# Patient Record
Sex: FEMALE | Race: WHITE
[De-identification: names, ages, dates, MRNs, and addresses within clinical notes are randomized per-mention and may not be internally consistent; named-entity substitution may affect disease eponyms.]

---

## 2021-06-13 ENCOUNTER — HOSPITAL ENCOUNTER (EMERGENCY)
Dept: HOSPITAL 97 - ER | Age: 66
Discharge: HOME | End: 2021-06-13
Payer: COMMERCIAL

## 2021-06-13 VITALS — DIASTOLIC BLOOD PRESSURE: 80 MMHG | SYSTOLIC BLOOD PRESSURE: 122 MMHG

## 2021-06-13 VITALS — TEMPERATURE: 98.3 F | OXYGEN SATURATION: 100 %

## 2021-06-13 DIAGNOSIS — S61.313A: Primary | ICD-10-CM

## 2021-06-13 DIAGNOSIS — S62.633A: ICD-10-CM

## 2021-06-13 DIAGNOSIS — Z23: ICD-10-CM
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
## 2021-06-13 PROCEDURE — 96372 THER/PROPH/DIAG INJ SC/IM: CPT

## 2021-06-13 PROCEDURE — 0JQK0ZZ REPAIR LEFT HAND SUBCUTANEOUS TISSUE AND FASCIA, OPEN APPROACH: ICD-10-PCS

## 2021-06-13 PROCEDURE — 90471 IMMUNIZATION ADMIN: CPT

## 2021-06-13 PROCEDURE — 90714 TD VACC NO PRESV 7 YRS+ IM: CPT

## 2021-06-13 PROCEDURE — 73130 X-RAY EXAM OF HAND: CPT

## 2021-06-13 PROCEDURE — 12001 RPR S/N/AX/GEN/TRNK 2.5CM/<: CPT

## 2021-06-13 PROCEDURE — 29130 APPL FINGER SPLINT STATIC: CPT

## 2021-06-13 PROCEDURE — 99284 EMERGENCY DEPT VISIT MOD MDM: CPT

## 2021-06-13 PROCEDURE — 2W3KX1Z IMMOBILIZATION OF LEFT FINGER USING SPLINT: ICD-10-PCS

## 2021-06-13 NOTE — RAD REPORT
EXAM DESCRIPTION:  RAD - Hand Left 3 View - 6/13/2021 4:20 pm

 

CLINICAL HISTORY:  laceration

Pain and swelling

 

COMPARISON:  <Comparisons>

 

FINDINGS:  Soft tissue laceration is seen involving the distal third finger with underlying tuft frac
ture.

## 2021-06-13 NOTE — EDPHYS
Physician Documentation                                                                           

 [facility]                                                                 

Name: Louise Pérez                                                                              

Age: 65 yrs                                                                                       

Sex: Female                                                                                       

: 1955                                                                                   

MRN: [id number]                                                                                   

Arrival Date: 2021                                                                          

Time: 15:37                                                                                       

Account#: [id number]                                                                            

Bed 23                                                                                            

Private MD: Ulisses Zuñiga V                                                                      

ED Physician Boris Milton                                                                         

HPI:                                                                                              

                                                                                             

16:10 This 65 yrs old  Female presents to ER via Ambulatory with complaints of       pm1 

      Finger Injury.                                                                              

16:10 The patient or guardian reports a laceration. The complaints affect the left middle     pm1 

      finger. Context: The problem was sustained outdoors, resulted from laceration from her      

      hedge cutter. Onset: The symptoms/episode began/occurred just prior to arrival.             

      Modifying factors: The symptoms are alleviated by pressure to area, the symptoms are        

      aggravated by nothing. Associated signs and symptoms: Pertinent negatives: cyanosis         

      distally, decreased sensation distally, numbness distally, tingling distally. Severity      

      of symptoms: in the emergency department the symptoms have improved, bleeding resolved.     

      The patient has not experienced similar symptoms in the past. Patient was cutting the       

      hedges with one hand and somehow the edger ended up cutting her left middle finger as       

      she was clearing out leaves.                                                                

                                                                                                  

Historical:                                                                                       

- Allergies:                                                                                      

15:47 No Known Allergies;                                                                     ll1 

- PMHx:                                                                                           

15:47 None;                                                                                   ll1 

- PSHx:                                                                                           

15:47 RIB FRACTURE - 1 WEEK AGO; knee SX;                                                     ll1 

                                                                                                  

- Immunization history:: Client reports having NOT received the Covid vaccine. Last               

  tetanus immunization: unknown, Flu vaccine is not up to date.                                   

- Social history:: Smoking status: Patient denies any tobacco usage or history of.                

                                                                                                  

                                                                                                  

ROS:                                                                                              

16:10 Constitutional: Negative for fever, chills, and weight loss, Cardiovascular: Negative   pm1 

      for chest pain, palpitations, and edema, Respiratory: Negative for shortness of breath,     

      cough, wheezing, and pleuritic chest pain, Abdomen/GI: Negative for abdominal pain,         

      nausea, vomiting, diarrhea, and constipation, Neuro: Negative for headache, weakness,       

      numbness, tingling, and seizure.                                                            

16:10 MS/extremity: Positive for laceration, of the left middle finger, Negative for              

      decreased range of motion.                                                                  

16:10 Skin: Positive for laceration(s), of the left middle finger.                                

16:10 All other systems are negative.                                                             

                                                                                                  

Exam:                                                                                             

16:10 Constitutional:  This is a well developed, well nourished patient who is awake, alert,  pm1 

      and in no acute distress. Head/Face:  Normocephalic, atraumatic.                            

16:10 ENT: Mouth: is normal, Lips: normal, moist, Oral mucosa: normal, pink and intact, moist.    

16:10 Cardiovascular: Rate: normal, Rhythm: regular, Pulses: no pulse deficits are                

      appreciated.                                                                                

16:10 Respiratory: Exam negative for  acute changes, respiratory distress, shortness of           

      breath.                                                                                     

16:10 Musculoskeletal/extremity: Extremities: grossly normal except: noted in the palmar          

      aspect of distal phalanx of left middle finger and left middle fingernail: laceration,      

      There is no evidence of  decreased ROM.                                                     

16:10 Skin: Appearance: normal except for affected area, injury, laceration(s), the wound is      

      approximately  2.5 cm(s), with a depth of  0.3 cm(s), of the palmar aspect of distal        

      phalanx of left middle finger and left middle fingernail, that can be described as          

      clean, no foreign body, irregular, without bleeding.                                        

16:10 Neuro: Exam negative for acute changes, Orientation: is normal, Mentation: is normal,       

      Motor: is normal, moves all fours, Sensation: is normal, no obvious gross deficits.         

                                                                                                  

Vital Signs:                                                                                      

15:44  / 105; Pulse 91; Resp 17; Temp 98.3; Pulse Ox 100% ; Weight 79.38 kg; Height 5   ll1 

      ft. 10 in. (177.80 cm); Pain 7/10;                                                          

18:47  / 80; Pulse 83; Resp 18; Pulse Ox 100% on R/A;                                   ae4 

15:44 Body Mass Index 25.11 (79.38 kg, 177.80 cm)                                             ll1 

                                                                                                  

Laceration:                                                                                       

21:17 Wound Repair of 2.5cm ( 1.0in ) subcutaneous laceration to palmar aspect of distal      pm1 

      phalanx of left middle finger and left middle fingernail. Irregularly shaped.. Distal       

      neuro/vascular/tendon intact. Anesthesia: Digital block administered with 2 mls of          

      Lido/Marcaine. Wound prep: Extensive cleansing with betadine with hibiclenz by me,          

      Wound irrigation with saline by me, Wound explored extensively, Copious irrigation.         

      Skin closed with 8 4-0 Prolene using simple sutures and sterile technique. nail closed      

      with thin layer Adhesive skin closure using Dermabond. Dressed with 4x4's, finger           

      splint. Patient tolerated well.                                                             

                                                                                                  

MDM:                                                                                              

15:52 Patient medically screened.                                                             pm1 

17:48 Data reviewed: vital signs. Data interpreted: Pulse oximetry: on room air is 100 %.     pm1 

      Interpretation: normal. Counseling: I had a detailed discussion with the patient and/or     

      guardian regarding: the historical points, exam findings, and any diagnostic results        

      supporting the discharge/admit diagnosis, radiology results, the need for outpatient        

      follow up, a hand specialist, to return to the emergency department if symptoms worsen      

      or persist or if there are any questions or concerns that arise at home.                    

                                                                                                  

                                                                                             

15:54 Order name: Hand Left 3 View XRAY; Complete Time: 16:35                                 pm1 

                                                                                             

15:54 Order name: Prolene, Sutures; Complete Time: 17:09                                      pm1 

                                                                                             

15:54 Order name: Dressing - Wound; Complete Time: 16:21                                      pm1 

                                                                                             

15:54 Order name: Gloves, Sterile; Complete Time: 16:05                                       pm1 

                                                                                             

15:54 Order name: Setup Suture Tray; Complete Time: 16:06                                     pm1 

                                                                                             

17:50 Order name: Splint - Finger; Complete Time: 18:46                                       pm1 

                                                                                                  

Administered Medications:                                                                         

16:10 Drug: Norco (HYDROcodone-acetaminophen) 5 mg-325 mg 1 tabs Route: PO;                   ae4 

18:46 Follow up: Response: No adverse reaction; Pain is decreased; RASS: Alert and Calm (0)   ae4 

16:29 Drug: Tetanus-Diphtheria Toxoid Adult 0.5 ml {: Buyapowa. Exp:         ae4 

      2023. Lot #: [id number]. } Route: IM; Site: right deltoid;                                 

17:06 Follow up: Response: No adverse reaction                                                ae4 

16:45 Drug: Lidocaine (1 %) 5 ml {Note: Administered by Hansel Ware NP to affected       ae4 

      area.} Volume: 5 ml; Route: Infiltration;                                                   

16:45 Drug: Marcaine (bupivacaine) (0.5 %) 10 ml {Note: ADminnistered by Hansel Ware NP  ae4 

      to affected area.} Volume: 10 ml; Route: Infiltration;                                      

18:16 Drug: Ancef (cefazolin) 1 grams Route: IM; Site: right gluteus;                         ae4 

18:46 Follow up: Response: No adverse reaction                                                ae4 

                                                                                                  

                                                                                                  

Disposition:                                                                                      

21 17:50 Discharged to Home. Impression: Laceration without foreign body of left middle     

  finger with damage to nail, Left middle finger distal tuft fracture.                            

- Condition is Stable.                                                                            

- Discharge Instructions: Cast or Splint Care, Adult, Tissue Adhesive Wound Care,                 

  Laceration Care, Adult.                                                                         

- Prescriptions for Keflex 500 mg Oral Capsule - take 1 capsule by ORAL route every 6             

  hours for 10 days; 40 capsule.                                                                  

- Medication Reconciliation Form, Thank You Letter, Antibiotic Education, Prescription            

  Opioid Use form.                                                                                

- Follow up: Emergency Department; When: As needed; Reason: Worsening of condition.               

  Follow up: Private Physician; When: 10 - 14 days; Reason: Recheck today's complaints,           

  Continuance of care, Staple/Suture removal, Re-evaluation by your physician. Follow             

  up: Ole Trivedi MD; When: 10 - 14 days; Reason: Recheck today's complaints,                

  Continuance of care, Staple/Suture removal, Re-evaluation by your physician.                    

- Problem is new.                                                                                 

- Symptoms have improved.                                                                         

                                                                                                  

                                                                                                  

                                                                                                  

Addendum:                                                                                         

06/15/2021                                                                                        

     19:27 Co-signature as Attending Physician, Boris Milton MD.                                    r
n

                                                                                                  

Signatures:                                                                                       

Dispatcher [employer]                           EDMS                                                 

Boris Milton MD MD rn Marinas, Patrick, NP                    NP   pm1                                                  

Homa Blevins Andrea, RN                     RN   ae4                                                  

Alyce Palmer RN                       RN   ll1                                                  

                                                                                                  

Corrections: (The following items were deleted from the chart)                                    

                                                                                             

17:51 17:50 2021 17:50 Discharged to Home. Impression: Laceration without foreign body  pm1 

      of left middle finger with damage to nail; Left middle finger distal tuft fracture.         

      Condition is Stable. Forms are Medication Reconciliation Form, Thank You Letter,            

      Antibiotic Education, Prescription Opioid Use. Follow up: Emergency Department; When:       

      As needed; Reason: Worsening of condition. Follow up: Private Physician; When: 10 - 14      

      days; Reason: Recheck today's complaints, Continuance of care, Staple/Suture removal,       

      Re-evaluation by your physician. Problem is new. Symptoms have improved. pm1                

18:48 17:51 2021 17:50 Discharged to Home. Impression: Laceration without foreign body  eb  

      of left middle finger with damage to nail; Left middle finger distal tuft fracture.         

      Condition is Stable. Discharge Instructions: Cast or Splint Care, Adult, Tissue             

      Adhesive Wound Care, Laceration Care, Adult. Prescriptions for Keflex 500 mg Oral           

      Capsule - take 1 capsule by ORAL route every 6 hours for 10 days; 40 capsule. and Forms     

      are Medication Reconciliation Form, Thank You Letter, Antibiotic Education,                 

      Prescription Opioid Use. Follow up: Emergency Department; When: As needed; Reason:          

      Worsening of condition. Follow up: Private Physician; When: 10 - 14 days; Reason:           

      Recheck today's complaints, Continuance of care, Staple/Suture removal, Re-evaluation       

      by your physician. Follow up: Ole Trivedi; When: 10 - 14 days; Reason: Recheck           

      today's complaints, Continuance of care, Staple/Suture removal, Re-evaluation by your       

      physician. Problem is new. Symptoms have improved. pm1                                      

                                                                                                  

************************************************************************************************** Quality 47: Advance Care Plan: Advance Care Planning discussed and documented; advance care plan or surrogate decision maker documented in the medical record. Quality 226: Preventive Care And Screening: Tobacco Use: Screening And Cessation Intervention: Patient screened for tobacco use and is an ex/non-smoker Detail Level: Detailed Quality 130: Documentation Of Current Medications In The Medical Record: Current Medications Documented Quality 431: Preventive Care And Screening: Unhealthy Alcohol Use - Screening: Patient screened for unhealthy alcohol use using a single question and scores less than 2 times per year

## 2021-06-13 NOTE — ER
Nurse's Notes                                                                                     

 [facility]                                                                 

Name: Louise Pérez                                                                              

Age: 65 yrs                                                                                       

Sex: Female                                                                                       

: 1955                                                                                   

MRN: [id number]                                                                                   

Arrival Date: 2021                                                                          

Time: 15:37                                                                                       

Account#: [id number]                                                                            

Bed 23                                                                                            

Private MD: Ulisses Zuñiga V                                                                      

Diagnosis: Laceration without foreign body of left middle finger with damage to nail;Left middle  

  finger distal tuft fracture                                                                     

                                                                                                  

Presentation:                                                                                     

                                                                                             

15:44 Chief complaint: Patient states: Accidentally cut tip of L hand 3rd digit with a hedger ll1 

      just PTA. Coronavirus screen: Client denies travel out of the [place] in the last 14 days.     

      At this time, the client does not indicate any symptoms associated with coronavirus-19.     

      Ebola Screen: Patient denies travel to an Ebola-affected area in the 21 days before         

      illness onset. Initial Sepsis Screen: Does the patient meet any 2 criteria? No.             

      Patient's initial sepsis screen is negative. Does the patient have a suspected source       

      of infection? Yes: Skin breakdown/wound. Risk Assessment: Do you want to hurt yourself      

      or someone else? Patient reports no desire to harm self or others. Onset of symptoms        

      was 2021.                                                                          

15:44 Method Of Arrival: Ambulatory                                                           ll1 

15:44 Acuity: FRENCH 4                                                                           ll1 

                                                                                                  

Triage Assessment:                                                                                

16:45 General: Appears uncomfortable, Behavior is cooperative, anxious, Please see full       ae4 

      triage assessment..                                                                         

19:32 Injury Description: Laceration sustained to dorsal aspect of distal phalanx of left     ae4 

      middle finger and palmar aspect of distal phalanx of left middle finger.                    

                                                                                                  

Historical:                                                                                       

- Allergies:                                                                                      

15:47 No Known Allergies;                                                                     ll1 

- PMHx:                                                                                           

15:47 None;                                                                                   ll1 

- PSHx:                                                                                           

15:47 RIB FRACTURE - 1 WEEK AGO; knee SX;                                                     ll1 

                                                                                                  

- Immunization history:: Client reports having NOT received the Covid vaccine. Last               

  tetanus immunization: unknown, Flu vaccine is not up to date.                                   

- Social history:: Smoking status: Patient denies any tobacco usage or history of.                

                                                                                                  

                                                                                                  

Screenin:47 Abuse screen: Denies threats or abuse. Denies injuries from another. Nutritional        ae4 

      screening: No deficits noted. Tuberculosis screening: No symptoms or risk factors           

      identified. Fall Risk None identified.                                                      

                                                                                                  

Assessment:                                                                                       

17:00 General: Appears uncomfortable, Behavior is cooperative, anxious. Pain: Complains of    ae4 

      pain in dorsal aspect of distal phalanx of left middle finger, dorsal aspect of middle      

      phalanx of left middle finger, palmar aspect of distal phalanx of left middle finger        

      and palmar aspect of middle phalanx of left middle finger. Neuro: Level of                  

      Consciousness is awake, alert, obeys commands, Oriented to person, place, time,             

      situation, Appropriate for age. Cardiovascular: Patient's skin is warm and dry.             

      Respiratory: Airway is patent Respiratory effort is even, unlabored, Respiratory            

      pattern is regular, symmetrical. GI: No signs and/or symptoms were reported involving       

      the gastrointestinal system. : No signs and/or symptoms were reported regarding the       

      genitourinary system. EENT: No signs and/or symptoms were reported regarding the EENT       

      system. Derm: Wound noted dorsal aspect of distal phalanx of left middle finger and         

      palmar aspect of distal phalanx of left middle finger. Musculoskeletal: Swelling            

      present in dorsal aspect of distal phalanx of left middle finger and palmar aspect of       

      distal phalanx of left middle finger. Injury Description: Laceration a small amount of      

      bleeding noted at this time.                                                                

17:10 Reassessment: Provider at bedside, providing care and patient teaching.                 ae4 

18:00 Reassessment: Patient appears in no apparent distress at this time. Patient states      ae4 

      feeling better. Patient states symptoms have improved.                                      

                                                                                                  

Vital Signs:                                                                                      

15:44  / 105; Pulse 91; Resp 17; Temp 98.3; Pulse Ox 100% ; Weight 79.38 kg; Height 5   ll1 

      ft. 10 in. (177.80 cm); Pain 7/10;                                                          

18:47  / 80; Pulse 83; Resp 18; Pulse Ox 100% on R/A;                                   ae4 

15:44 Body Mass Index 25.11 (79.38 kg, 177.80 cm)                                             ll1 

                                                                                                  

ED Course:                                                                                        

15:37 Patient arrived in ED.                                                                  am2 

15:37 Ulisses Zuñiga MD is Private Physician.                                                 am2 

15:46 Triage completed.                                                                       ll1 

15:47 Arm band placed on Patient placed in an exam room, on a stretcher.                      ll1 

15:50 Hansel Ware NP is PHCP.                                                           pm1 

15:50 Boris Milton MD is Attending Physician.                                                pm1 

15:54 Rashad Ann RN is Primary Nurse.                                                   ae4 

16:00 Bed in low position. Call light in reach. Side rails up X 1. Pulse ox on.               ae4 

16:20 Hand Left 3 View XRAY In Process Unspecified.                                           EDMS

17:51 Ole Trivedi MD is Referral Physician.                                              pm1 

19:31 Assist provider with laceration repair that was 2.5 cm. or less using Dermabond. Set up ae4 

      tray. Performed by Hansel Ware NP Dressed with Patient tolerated well. Patient did      

      not have IV access during this emergency room visit.                                        

                                                                                                  

Administered Medications:                                                                         

16:10 Drug: Norco (HYDROcodone-acetaminophen) 5 mg-325 mg 1 tabs Route: PO;                   ae4 

18:46 Follow up: Response: No adverse reaction; Pain is decreased; RASS: Alert and Calm (0)   ae4 

16:29 Drug: Tetanus-Diphtheria Toxoid Adult 0.5 ml {: 365Scores. Exp:         ae4 

      2023. Lot #: [id number]. } Route: IM; Site: right deltoid;                                 

17:06 Follow up: Response: No adverse reaction                                                ae4 

16:45 Drug: Lidocaine (1 %) 5 ml {Note: Administered by Hansel Ware NP to affected       ae4 

      area.} Volume: 5 ml; Route: Infiltration;                                                   

16:45 Drug: Marcaine (bupivacaine) (0.5 %) 10 ml {Note: ADminnistered by Hansel Ware NP  ae4 

      to affected area.} Volume: 10 ml; Route: Infiltration;                                      

18:16 Drug: Ancef (cefazolin) 1 grams Route: IM; Site: right gluteus;                         ae4 

18:46 Follow up: Response: No adverse reaction                                                ae4 

                                                                                                  

                                                                                                  

Outcome:                                                                                          

17:50 Discharge ordered by MD.                                                                pm1 

18:48 Patient left the ED.                                                                    eb  

19:32 Discharged to home ambulatory, with significant other.                                  ae4 

19:32 Condition: stable                                                                           

19:32 Discharge instructions given to patient, significant other, Instructed on discharge         

      instructions, follow up and referral plans. medication usage, Demonstrated                  

      understanding of instructions, follow-up care, medications, Prescriptions given X 1.        

                                                                                                  

Signatures:                                                                                       

Dispatcher [employer]                           EDMS                                                 

Hansel Ware NP                    NP   pm1                                                  

Mary Anne Navas                               am2                                                  

Homa Blevins                           eb                                                   

Rashad Ann, NATALIE                     RN   ae4                                                  

Spencer, Lynsay, RN                       RN   ll1                                                  

                                                                                                  

Corrections: (The following items were deleted from the chart)                                    

17:12 17:10 Reassessment: Provider at bedside discussing plan of care. josiah rahman 

                                                                                                  

**************************************************************************************************

## 2021-06-30 ENCOUNTER — HOSPITAL ENCOUNTER (EMERGENCY)
Dept: HOSPITAL 97 - ER | Age: 66
Discharge: HOME | End: 2021-06-30
Payer: MEDICARE

## 2021-06-30 VITALS — SYSTOLIC BLOOD PRESSURE: 168 MMHG | OXYGEN SATURATION: 100 % | DIASTOLIC BLOOD PRESSURE: 94 MMHG | TEMPERATURE: 97.7 F

## 2021-06-30 DIAGNOSIS — Z48.02: Primary | ICD-10-CM

## 2021-06-30 NOTE — ER
Nurse's Notes                                                                                     

 [facility]                                                                 

Name: Louise Pérez                                                                              

Age: 65 yrs                                                                                       

Sex: Female                                                                                       

: 1955                                                                                   

MRN: [id number]                                                                                   

Arrival Date: 2021                                                                          

Time: 14:08                                                                                       

Account#: [id number]                                                                            

Bed Waiting                                                                                       

Private MD:                                                                                       

Diagnosis: Encounter for removal of sutures                                                       

                                                                                                  

Presentation:                                                                                     

                                                                                             

14:28 Chief complaint: Patient states: Suture removal from left middle finger, placed     jl7 

      2021. Coronavirus screen: Client denies travel out of the [place] in the last 14 days.     

      At this time, the client does not indicate any symptoms associated with coronavirus-19.     

      Ebola Screen: No symptoms or risks identified at this time. Initial Sepsis Screen: Does     

      the patient meet any 2 criteria? No. Patient's initial sepsis screen is negative. Does      

      the patient have a suspected source of infection? No. Patient's initial sepsis screen       

      is negative. Risk Assessment: Do you want to hurt yourself or someone else? Patient         

      reports no desire to harm self or others. Onset of symptoms was 2021. Care         

      prior to arrival: None.                                                                     

14:28 Method Of Arrival: Ambulatory                                                           jl7 

14:28 Acuity: FRENCH 5                                                                           jl7 

                                                                                                  

Triage Assessment:                                                                                

14:30 General: Appears in no apparent distress. uncomfortable, Behavior is calm, cooperative, jl7 

      appropriate for age. Pain: Complains of pain in left middle finger. Neuro: Level of         

      Consciousness is awake, alert, obeys commands, Oriented to person, place, time,             

      situation. Cardiovascular: Patient's skin is warm and dry. Respiratory: Airway is           

      patent Respiratory effort is even, unlabored, Respiratory pattern is regular,               

      symmetrical. Derm: Skin is pink, warm \T\ dry.                                              

                                                                                                  

Historical:                                                                                       

- Allergies:                                                                                      

14:30 No Known Allergies;                                                                     jl7 

- PMHx:                                                                                           

14:30 None;                                                                                   jl7 

                                                                                                  

- Immunization history:: Adult Immunizations up to date.                                          

- Social history:: Smoking status: Patient denies any tobacco usage or history of.                

                                                                                                  

                                                                                                  

Screenin:31 Abuse screen: Denies threats or abuse. Denies injuries from another. Nutritional        jl7 

      screening: No deficits noted. Tuberculosis screening: No symptoms or risk factors           

      identified. Fall Risk None identified.                                                      

                                                                                                  

Assessment:                                                                                       

14:31 General: See triage assessment.                                                         jl7 

                                                                                                  

Vital Signs:                                                                                      

14:28  / 94; Pulse 66; Resp 17; Temp 97.7; Pulse Ox 100% on R/A;                        7 

                                                                                                  

ED Course:                                                                                        

14:08 Patient arrived in ED.                                                                  wm  

14:28 Ebenezer Disla RN is Primary Nurse.                                                      jl7 

14:30 Triage completed.                                                                       jl7 

14:30 Arm band placed on right wrist.                                                         jl7 

14:31 Patient has correct armband on for positive identification.                             jl7 

14:31 Suture removal. Patient did not have IV access during this emergency room visit.        jl7 

14:35 [facility] Calder[facility]                                                        kb  

14:35 Efrain Mccloud MD is Attending Physician.                                              kb  

                                                                                                  

Administered Medications:                                                                         

No medications were administered                                                                  

                                                                                                  

                                                                                                  

Outcome:                                                                                          

14:34 Following a medical screening exam, the patient was provided information regarding      [facility] 

      alternative care sites and resources available per registration personnel.                  

14:35 Discharge ordered by MD.                                                                kb  

15:03 Patient left the ED.                                                                    [facility] 

                                                                                                  

Signatures:                                                                                       

Mary Lou Calderon FNP-C                 FNP-Ebenezer Gonzalez RN                        RN   jl7                                                  

Jessica Dela Cruz                                                                                    

                                                                                                  

Corrections: (The following items were deleted from the chart)                                    

14:31 14:30 PMHx: None; [place] [id number] 

                                                                                                  

**************************************************************************************************

## 2021-06-30 NOTE — EDPHYS
Physician Documentation                                                                           

 [facility]                                                                 

Name: Louise Pérez                                                                              

Age: 65 yrs                                                                                       

Sex: Female                                                                                       

: 1955                                                                                   

MRN: [id number]                                                                                   

Arrival Date: 2021                                                                          

Time: 14:08                                                                                       

Account#: [id number]                                                                            

Bed Waiting                                                                                       

Private MD:                                                                                       

ED Physician Efrain Mccloud                                                                       

HPI:                                                                                              

                                                                                             

14:55 This 65 yrs old  Female presents to ER via Ambulatory with complaints of       kb  

      Suture Removal.                                                                             

14:55 The patient has sutures on the left middle finger. Previous treatment: The patient was  kb  

      initially treated 14 day(s) ago, the care was rendered at [facility], Treatment type: The patient's original treatment included sutures.                  

      Sutures/staples progress: The patient has no c/o's. The wound is well-healing with no       

      redness, swelling, discharge, or dehiscence reported. The patient has not experienced       

      similar symptoms in the past. The patient has not recently seen a physician.                

                                                                                                  

Historical:                                                                                       

- Allergies:                                                                                      

14:30 No Known Allergies;                                                                     jl7 

- PMHx:                                                                                           

14:30 None;                                                                                   jl7 

                                                                                                  

- Immunization history:: Adult Immunizations up to date.                                          

- Social history:: Smoking status: Patient denies any tobacco usage or history of.                

                                                                                                  

                                                                                                  

ROS:                                                                                              

14:54 Constitutional: Negative for fever, chills, and weight loss, MS/Extremity: Negative for kb  

      injury and deformity, Neuro: Negative for headache, weakness, numbness, tingling, and       

      seizure.                                                                                    

14:54 Skin: Positive for of the left middle finger, sutures in place.                             

                                                                                                  

Exam:                                                                                             

14:54 Constitutional:  This is a well developed, well nourished patient who is awake, alert,  kb  

      and in no acute distress. MS/ Extremity:  Pulses equal, no cyanosis.  Neurovascular         

      intact.  Full, normal range of motion. Neuro:  Awake and alert, GCS 15, oriented to         

      person, place, time, and situation. Moves all extremities. Normal gait.                     

14:54 Skin: Wound recheck: Suture laceration closure: the wound is healing well, the edges        

      are well approximated, no evidence of dehiscence, no drainage, no erythema, no swelling.    

                                                                                                  

Vital Signs:                                                                                      

14:28  / 94; Pulse 66; Resp 17; Temp 97.7; Pulse Ox 100% on R/A;                        jl7 

                                                                                                  

Procedures:                                                                                       

14:54 Suture/Staple removal: Removed 8 sutures, from left middle finger, site appears well    kb  

      healed, Patient tolerated well.                                                             

                                                                                                  

MDM:                                                                                              

14:35 Patient medically screened.                                                             kb  

14:53 Data reviewed: vital signs, nurses notes. Data interpreted: Pulse oximetry: on room air kb  

      is 100 %. Interpretation: normal. Counseling: I had a detailed discussion with the          

      patient and/or guardian regarding: the historical points, exam findings, and any            

      diagnostic results supporting the discharge/admit diagnosis, the need for outpatient        

      follow up, a family practitioner, to return to the emergency department if symptoms         

      worsen or persist or if there are any questions or concerns that arise at home.             

                                                                                                  

Administered Medications:                                                                         

No medications were administered                                                                  

                                                                                                  

                                                                                                  

Disposition:                                                                                      

                                                                                             

08:41 Co-signature as Attending Physician, Efrain Mccloud MD I agree with the assessment and   kdr 

      plan of care.                                                                               

                                                                                                  

Disposition Summary:                                                                              

21 14:35                                                                                    

Discharge Ordered                                                                                 

      Location: Home                                                                          kb  

      Condition: Stable                                                                       kb  

      Diagnosis                                                                                   

        - Encounter for removal of sutures                                                    kb  

      Followup:                                                                               kb  

        - With: Emergency Department                                                               

        - When: As needed                                                                          

        - Reason: Worsening of condition                                                           

      Followup:                                                                               kb  

        - With: Private Physician                                                                  

        - When: 2 - 3 days                                                                         

        - Reason: Recheck today's complaints, Continuance of care, Re-evaluation by your           

      physician                                                                                   

      Discharge Instructions:                                                                     

        - Discharge Summary Sheet                                                             kb  

        - Suture Removal, Care After                                                          kb  

      Forms:                                                                                      

        - Medication Reconciliation Form                                                      kb  

        - Thank You Letter                                                                    kb  

        - Antibiotic Education                                                                kb  

        - Prescription Opioid Use                                                             kb  

Signatures:                                                                                       

Mary Lou Calderon, SHILPAP-C                 FNP-Ckb                                                   

Efrain Mccloud MD MD   kdr                                                  

Ebenezer Disla, RN                        RN   jl7                                                  

                                                                                                  

Corrections: (The following items were deleted from the chart)                                    

                                                                                             

14:31 14:30 PMHx: None; jl7                                                                   jl7 

14:54 14:54 Constitutional: Negative for fever, chills, and weight loss, kb                   kb  

                                                                                                  

**************************************************************************************************